# Patient Record
Sex: FEMALE | Race: WHITE
[De-identification: names, ages, dates, MRNs, and addresses within clinical notes are randomized per-mention and may not be internally consistent; named-entity substitution may affect disease eponyms.]

---

## 2019-11-25 ENCOUNTER — HOSPITAL ENCOUNTER (INPATIENT)
Dept: HOSPITAL 25 - MCHOBOUT | Age: 34
LOS: 4 days | Discharge: HOME | DRG: 560 | End: 2019-11-29
Attending: MIDWIFE | Admitting: MIDWIFE
Payer: COMMERCIAL

## 2019-11-25 DIAGNOSIS — Z3A.41: ICD-10-CM

## 2019-11-25 DIAGNOSIS — O48.0: Primary | ICD-10-CM

## 2019-11-25 PROCEDURE — 85025 COMPLETE CBC W/AUTO DIFF WBC: CPT

## 2019-11-25 PROCEDURE — 85461 HEMOGLOBIN FETAL: CPT

## 2019-11-25 PROCEDURE — 36415 COLL VENOUS BLD VENIPUNCTURE: CPT

## 2019-11-25 PROCEDURE — 86850 RBC ANTIBODY SCREEN: CPT

## 2019-11-25 PROCEDURE — 86880 COOMBS TEST DIRECT: CPT

## 2019-11-25 PROCEDURE — 86900 BLOOD TYPING SEROLOGIC ABO: CPT

## 2019-11-25 PROCEDURE — 86901 BLOOD TYPING SEROLOGIC RH(D): CPT

## 2019-11-25 PROCEDURE — 80307 DRUG TEST PRSMV CHEM ANLYZR: CPT

## 2019-11-25 PROCEDURE — 86870 RBC ANTIBODY IDENTIFICATION: CPT

## 2019-11-25 NOTE — HP
General Information





- Reason for Visit





Post dates pregnancy for induction of labor.





- General Information


Maternal Age: 34


Grav: 1


Para: 0


SAB: 0


IEA: 0





Estimated Due Date: 19


Determined By: Early Ultrasound


Gestational Age in Weeks/Days: 41+5


Maternal Blood Type and Rh: O Negative





- Results this Pregnancy


Serology/RPR Result: Non-Reactive


Rubella Result: Immune


HBsAg Result: Negative


HIV Result: Negative


GBS Culture Result: Negative





Past Medical History


Delivery History: See  Records


Delivery History Comment: 





No previous pregnancies


Pertinent Past Medical History: Non-Contributory


Past Medical History Comment: 





Seasonal allergies


Pertinent Past Surgical History: None


Pertinent Family History: See  Records


Family History Comment: 





Diabetes


pulmonary htn


Alzheimer's








- Antepartal Records


Antepartal Records: Reviewed, Pregnancy Complicated by: - Rh negative; received 

Rhogam @ 28 weeks





Review of Systems


Constitutional: Comfortable


CV Complaint: No


Respiratory: Shortness of Breath: No


Gastrointestinal: No Nausea/Vomiting, Normal Bowel Movement


Genitourinary: No Dysuria, No Bleeding, No Leaking Fluid


Musculoskeletal: No Complaint, Contractions - Not feeling any contractions


Neurological: No Headache, No Visual Changes


Fetal Movement: Normal





Exam


Allergies/Adverse Reactions: 


Allergies





No Known Allergies Allergy (Verified 19 18:30)


 











/78


T 98.3


HR 84





- Measurements


Height: 5 ft 2 in


Weight: 162 lb


Weight in lbs: 162.434351


Body Mass Index (BMI): 29.6


Pre-Pregnancy Weight: 135 lb


Weight Gained This Pregnancy: 27 lbs and 0 ozs





- Exam


Breast: Breast Exam Deferred


CVA: No CVA Tenderness


Extremities: No Edema


Heart: Normal Rhythm/Heart Sounds


HEENT: No Significant Findings


Lungs: Clear Bilaterally


Rectal: Rectal Exam Deferred


Reflexes: DTR 2+


Thyroid: - - WNL @ entry to  care





- Abdominal Exam


Abdomen Exam: Non-Tender, Fundal Height Consistent with Dates





- Ultrasound/Biophysical Profile


Ultrasound Status: Not Done





Targeted Exam Findings


Estimated Fetal Weight: 8lb


Cervical Exam: 1cm


Effacement: 60%


Station: -1


Presenting Part: Vertex


Membrane Status: Intact


Bleeding/Discharge: Bloody Show





EFM Findings





- External Fetal Monitor Findings


Baseline Fetal Heart Rate: 125


External Fetal Monitor Findings: Accelerations Present, No Pattern of Variable 

or Late Decelerations, Variability Moderate


Contractions: Irregular, Mild


Contraction Frequency: q 6-8 min





Assessment/Plan





- Assessment





IUP @ 41+5 weeks gestation for induction of labor. Intact membranes. No 

evidence fetal metabolic acidemia





- Plan


Plan: Induction, Cervical Ripening, Admit - Anticipate Vaginal Delivery


Plan Comment: 





PARQ discussion of the use of cervidil for induction; patients in agreement. We 

discussed possibly using Cooks vs cervidil vs misoprostal after cervidil if 

continued ripening indicated. Can consider Nubain/Phenergan for therapeutic 

rest if desired.





- Date/Time of Admission


Date of Admission: 19


Time of Admission: 19:30

## 2019-11-26 LAB
BASOPHILS # BLD AUTO: 0 10^3/UL (ref 0–0.2)
EOSINOPHIL # BLD AUTO: 0.1 10^3/UL (ref 0–0.6)
HCT VFR BLD AUTO: 40 % (ref 35–47)
HGB BLD-MCNC: 14.1 G/DL (ref 12–16)
LYMPHOCYTES # BLD AUTO: 1.6 10^3/UL (ref 1–4.8)
MCH RBC QN AUTO: 34 PG (ref 27–31)
MCHC RBC AUTO-ENTMCNC: 35 G/DL (ref 31–36)
MCV RBC AUTO: 98 FL (ref 80–97)
MONOCYTES # BLD AUTO: 0.8 10^3/UL (ref 0–0.8)
NEUTROPHILS # BLD AUTO: 9.1 10^3/UL (ref 1.5–7.7)
NRBC # BLD AUTO: 0 10^3/UL
NRBC BLD QL AUTO: 0
PLATELET # BLD AUTO: 147 10^3/UL (ref 150–450)
RBC # BLD AUTO: 4.11 10^6 /UL (ref 3.7–4.87)
WBC # BLD AUTO: 11.6 10^3/UL (ref 3.5–10.8)

## 2019-11-26 RX ADMIN — SODIUM CHLORIDE, SODIUM LACTATE, POTASSIUM CHLORIDE, AND CALCIUM CHLORIDE SCH MLS/HR: 600; 310; 30; 20 INJECTION, SOLUTION INTRAVENOUS at 20:33

## 2019-11-26 NOTE — PN
Progress Note





- Progress Note


Date of Service: 11/26/19


SOAP: 


Subjective:


Pt feeling well. She reports that following phenergan and then Nubain 

administration she got some solid sleep. She feels some contractions, still 

fairly comfortable.  








Objective:


Cervix: 2-3 cm/ 80%/ 0 station


BP: 93/60, T: 98.0


FHR: Baseline 115/ moderate variability/ + accels, no decels


UCs: 3-5


Membranes intact


Cervidil removed at 0815








Assessment:


Pt with good response to cervical ripening, Watts Score =9. No evidence of 

fetal acidemia. 








Plan:


Discussed options including proceeding immediately to Pitocin augmentation vs 

expectant management as pt is currently geraldine every 3-5 minutes. Pt would 

like to see if ctx progress on their own prior to progressing to Pitocin. This 

seems reasonable. Will plan to recheck at 1100 and reevaluate plan at that 

time.

## 2019-11-26 NOTE — PN
Progress Note





- Progress Note


Date of Service: 11/26/19


Note: 





Subjective:


Pt with increasing discomfort with ctx, still coping very well. Feeling 

pressure.  at bedside. 





Objective:


VE: 4.5/80/-2, vtx


Pitocin at 4 mu/min


FHR: Baseline 120/ + accels/ no decels


UCs: 2-3 minutes


BP: 113/69, Temp: 97.4





Assessment:


Pt coping well. Augmentation underway with Pitocin. No evidence of fetal 

acidemia or chorioamnionitis. 





Plan:


Continue Pitocin augmentation. Labor support ad comfort measures as needed.

## 2019-11-26 NOTE — PN
Progress Note





- Progress Note


Date of Service: 11/26/19


SOAP: 


Subjective:


Pt reports UCs feeling stronger, still fairly comfortable. Ambulated in halls, 

now resting in bed. 








Objective:


Cervix: 3cm/ 80%/ 0 station


FHR:Baseline 125/ moderate variability/ + accels/ no decels


UCs: Q 3-5 minutes 


Membranes intact





Assessment:


Pt has made some cervical change. No evidence of fetal acidemia. Still in early 

labor. 








Plan:


As pt has made some change and has strong preference to avoid Pitocin if 

possible, will wait another 2 hours or so and recheck. If no significant change 

at that time will consider Pitocin augmentation.

## 2019-11-26 NOTE — PN
Progress Note





- Progress Note


Date of Service: 11/26/19


SOAP: 


Subjective:


Pt reports ctx are painful when they occur but have become more spaced out. 


Finds it more comfortable to be upright than in bed. 





Objective:


Cervix: unchanged from previous


FHR: Baseline 120/ moderate variability/ + accels/ no decels


UCs: Q4-6 minutes








Assessment:


Minimal progress at this time. No evidence of fetal acidemia. 








Plan:


Recommend Pitocin augmentation. Pt in agreement. Will start low dose Pitocin 

augmentation.

## 2019-11-26 NOTE — PN
Progress Note





- Progress Note


Date of Service: 11/26/19


Note: 





Pt coping well with ctx. Requests nitrous oxide, provided.  at bedside, 

supportive. Cervix 4cm/ 80%/ 0 station.  FHR Category I. Pitocin at 4 mu/min. 

UCs 2-3 minutes. Membranes intact. Plan nitrous oxide for pain relief. Continue 

Pitocin augmentation. Encourage productive labor positions. Report to Destinee Jones CNM to assume care of pt at this time.

## 2019-11-26 NOTE — PN
Progress Note





- Progress Note


Date of Service: 11/26/19


SOAP: 


Subjective:


Pt with increasing discomfort with ctx, still coping very well.  at 

bedside. 








Objective:


Cervical exam deferred


Pitocin at 4 mu/min


FHR: Baseline 120/ + accels/ no decels


UCs: 2-3 minutes


BP: 113/69, Temp: 97.4








Assessment:


Pt coping well. Augmentation underway with Pitocin. No evidence of fetal 

acidemia or chorioamnionitis. 








Plan:


Continue Pitocin augmentation. Labor support ad comfort measures as needed.

## 2019-11-27 PROCEDURE — 3E033VJ INTRODUCTION OF OTHER HORMONE INTO PERIPHERAL VEIN, PERCUTANEOUS APPROACH: ICD-10-PCS | Performed by: ADVANCED PRACTICE MIDWIFE

## 2019-11-27 PROCEDURE — 0KQM0ZZ REPAIR PERINEUM MUSCLE, OPEN APPROACH: ICD-10-PCS | Performed by: ADVANCED PRACTICE MIDWIFE

## 2019-11-27 RX ADMIN — SODIUM CHLORIDE, SODIUM LACTATE, POTASSIUM CHLORIDE, AND CALCIUM CHLORIDE SCH MLS/HR: 600; 310; 30; 20 INJECTION, SOLUTION INTRAVENOUS at 01:50

## 2019-11-27 RX ADMIN — IBUPROFEN SCH MG: 600 TABLET, FILM COATED ORAL at 11:54

## 2019-11-27 RX ADMIN — IBUPROFEN SCH MG: 600 TABLET, FILM COATED ORAL at 18:28

## 2019-11-27 RX ADMIN — WITCH HAZEL PRN PAD: 5 CLOTH TOPICAL at 13:00

## 2019-11-27 RX ADMIN — DOCUSATE SODIUM SCH MG: 100 CAPSULE, LIQUID FILLED ORAL at 18:28

## 2019-11-27 RX ADMIN — DIBUCAINE PRN APPLIC: 1 OINTMENT TOPICAL at 13:00

## 2019-11-27 NOTE — PROCNOTE
[facility] OB: Delivery Note





- Delivery


  ** A


Date of Birth: [birth year]


Time of Birth: 10:25


 Sex: Male


[place] Weight at Birth: 8 lb 4 oz


Apgar Score 1 Minute: 8


Apgar Score 5 Minutes: 9


Gestational Age in Weeks and Days at Delivery: 42 Weeks and 0 Days


Delivery Method: Spontaneous Vaginal


Labor: Induced


Did Patient attempt ?: N/A, No Previous 


Amniotic Fluid: Meconium


Estimated Blood Loss: 400


Anesthesia/Analgesia: CEI for Labor, Nitrous-Labor, Other


Anesthesia Comment: nubain and phenergan


Delivered By: Patricia Jones





- Nursery


Level of Nursery: Regular/Bedside





- Perineum


Perineal Injury: Perineal Laceration, 2nd Degree


Perineal Injury Comment: repaired with CEI infusing under local anesthesia 


Perineal Repair: By Delivering Practioner





- Events


Delivery Events of Note: Pitocin During Labor


Delivery Events of Note Comment: nucchal cord x2, terminal mec





- Additional Delivery Notes


Additional Delivery Notes: 





Normal spontaneous vertex delivery of live male, 8lbs 4oz and Apgars 8/9. 

Delivered left occipital-anterior (YADIEL), nuchal cordx2. Body delivered without 

difficulty. Baby placed on mothers abdomen. Cord clamped and cut by FOB after 

pulsations ceased. Placenta delivered spontaneously via [place], appears intact

, 3vc. Pitocin given via IVPB for active management of 3rd stage. Perineum and 

vagina inspected - second degree laceration noted. Repaired in the usual 

fashion under local anesthesia with CEI infusing. Normal anatomy restored. 

Laceration hemostatic. EBL 400mL. Mom and baby stable at time of note. Baby 

attempting to breastfeed.

## 2019-11-27 NOTE — PN
Progress Note





- Progress Note


Date of Service: 19


Note: 





Pt resting comfortably with CEI infusing


Able to feel contractions, but sleeping through them


Feels mild pressure with contractions


Checked by RN and found to be 8/100


, moderate variability, +accels, early decels


No evidence of fetal metabolic acidemia 


Contractions every 2-4 minutes


Pit at 12mU


Anticipate

## 2019-11-27 NOTE — PN
Progress Note





- Progress Note


Date of Service: 11/27/19


Note: 





Pt requesting an epidural


Checked by RN and found to be 5/100


, moderate variability, +accels, no decels


No evidence of fetal metabolic acidemia 


Contractions every 3-4 minutes


Pit off until epidural placed


Anesthesia notified

## 2019-11-28 LAB
BASOPHILS # BLD AUTO: 0 10^3/UL (ref 0–0.2)
EOSINOPHIL # BLD AUTO: 0.1 10^3/UL (ref 0–0.6)
HCT VFR BLD AUTO: 30 % (ref 35–47)
HGB BLD-MCNC: 10.3 G/DL (ref 12–16)
LYMPHOCYTES # BLD AUTO: 2.2 10^3/UL (ref 1–4.8)
MCH RBC QN AUTO: 34 PG (ref 27–31)
MCHC RBC AUTO-ENTMCNC: 35 G/DL (ref 31–36)
MCV RBC AUTO: 99 FL (ref 80–97)
MONOCYTES # BLD AUTO: 0.9 10^3/UL (ref 0–0.8)
NEUTROPHILS # BLD AUTO: 9.2 10^3/UL (ref 1.5–7.7)
NRBC # BLD AUTO: 0 10^3/UL
NRBC BLD QL AUTO: 0
PLATELET # BLD AUTO: 112 10^3/UL (ref 150–450)
RBC # BLD AUTO: 3 10^6 /UL (ref 3.7–4.87)
WBC # BLD AUTO: 12.5 10^3/UL (ref 3.5–10.8)

## 2019-11-28 RX ADMIN — DOCUSATE SODIUM SCH MG: 100 CAPSULE, LIQUID FILLED ORAL at 22:04

## 2019-11-28 RX ADMIN — DIBUCAINE PRN APPLIC: 1 OINTMENT TOPICAL at 22:06

## 2019-11-28 RX ADMIN — IBUPROFEN SCH MG: 600 TABLET, FILM COATED ORAL at 02:11

## 2019-11-28 RX ADMIN — IBUPROFEN SCH MG: 600 TABLET, FILM COATED ORAL at 08:01

## 2019-11-28 RX ADMIN — DOCUSATE SODIUM SCH MG: 100 CAPSULE, LIQUID FILLED ORAL at 08:02

## 2019-11-28 RX ADMIN — DOCUSATE SODIUM SCH: 100 CAPSULE, LIQUID FILLED ORAL at 15:20

## 2019-11-28 RX ADMIN — WITCH HAZEL PRN PAD: 5 CLOTH TOPICAL at 22:06

## 2019-11-28 RX ADMIN — IBUPROFEN SCH MG: 600 TABLET, FILM COATED ORAL at 17:29

## 2019-11-28 RX ADMIN — IBUPROFEN SCH: 600 TABLET, FILM COATED ORAL at 15:24

## 2019-11-28 RX ADMIN — DOCUSATE SODIUM SCH: 100 CAPSULE, LIQUID FILLED ORAL at 08:09

## 2019-11-29 VITALS — DIASTOLIC BLOOD PRESSURE: 73 MMHG | SYSTOLIC BLOOD PRESSURE: 107 MMHG

## 2019-11-29 RX ADMIN — IBUPROFEN SCH MG: 600 TABLET, FILM COATED ORAL at 15:33

## 2019-11-29 RX ADMIN — IBUPROFEN SCH MG: 600 TABLET, FILM COATED ORAL at 00:27

## 2019-11-29 RX ADMIN — DOCUSATE SODIUM SCH MG: 100 CAPSULE, LIQUID FILLED ORAL at 15:08

## 2019-11-29 RX ADMIN — DOCUSATE SODIUM SCH MG: 100 CAPSULE, LIQUID FILLED ORAL at 09:30

## 2019-11-29 RX ADMIN — IBUPROFEN SCH MG: 600 TABLET, FILM COATED ORAL at 09:30

## 2019-11-29 NOTE — PTEDU
Patient Name: DIEGO RAMIREZ

MRN: [id number]



DIEGO RAMIREZ selected video: Never Ever Shake a Baby to view on 11/29/2019 at 12:38:43 PM from [facility] [place]
[id number]